# Patient Record
Sex: MALE | Race: OTHER | HISPANIC OR LATINO | URBAN - METROPOLITAN AREA
[De-identification: names, ages, dates, MRNs, and addresses within clinical notes are randomized per-mention and may not be internally consistent; named-entity substitution may affect disease eponyms.]

---

## 2023-01-15 ENCOUNTER — EMERGENCY (EMERGENCY)
Facility: HOSPITAL | Age: 23
LOS: 0 days | Discharge: HOME | End: 2023-01-15
Attending: EMERGENCY MEDICINE | Admitting: EMERGENCY MEDICINE
Payer: MEDICAID

## 2023-01-15 VITALS
OXYGEN SATURATION: 97 % | TEMPERATURE: 98 F | DIASTOLIC BLOOD PRESSURE: 53 MMHG | WEIGHT: 158.07 LBS | HEART RATE: 66 BPM | SYSTOLIC BLOOD PRESSURE: 121 MMHG | RESPIRATION RATE: 17 BRPM

## 2023-01-15 DIAGNOSIS — N48.89 OTHER SPECIFIED DISORDERS OF PENIS: ICD-10-CM

## 2023-01-15 DIAGNOSIS — N50.811 RIGHT TESTICULAR PAIN: ICD-10-CM

## 2023-01-15 LAB
APPEARANCE UR: CLEAR — SIGNIFICANT CHANGE UP
BILIRUB UR-MCNC: NEGATIVE — SIGNIFICANT CHANGE UP
COLOR SPEC: SIGNIFICANT CHANGE UP
DIFF PNL FLD: NEGATIVE — SIGNIFICANT CHANGE UP
GLUCOSE UR QL: NEGATIVE — SIGNIFICANT CHANGE UP
KETONES UR-MCNC: NEGATIVE — SIGNIFICANT CHANGE UP
LEUKOCYTE ESTERASE UR-ACNC: NEGATIVE — SIGNIFICANT CHANGE UP
NITRITE UR-MCNC: NEGATIVE — SIGNIFICANT CHANGE UP
PH UR: 7 — SIGNIFICANT CHANGE UP (ref 5–8)
PROT UR-MCNC: SIGNIFICANT CHANGE UP
SP GR SPEC: 1.03 — SIGNIFICANT CHANGE UP (ref 1.01–1.03)
UROBILINOGEN FLD QL: SIGNIFICANT CHANGE UP

## 2023-01-15 PROCEDURE — 99284 EMERGENCY DEPT VISIT MOD MDM: CPT

## 2023-01-15 NOTE — ED PROVIDER NOTE - ATTENDING APP SHARED VISIT CONTRIBUTION OF CARE
22-year-old  male without any pertinent past medical history presented for evaluation of a skin lesion on his foreskin noted today.  Patient reports similar symptoms in the past, denies any hematuria.  There is no history of STIs, patient is monogamous with his wife, she does not have any complaints. He denies any fever chills, no abdominal back pain, no urinary frequency or hematuria, no weight loss, no other additional complaints.  Well-appearing young male in no acute distress, PERRL, pink conjunctiva, speaking full sentences, lungs clear to auscultation, no midline spine or CVA TTP, abdomen soft/NT/ND,  exam, done in the presence of RADHA [place], shows normal male external genitalia, patient is uncircumcised, there are no vesicular lesions, there is a linear fissure on the foreskin which is a source of discomfort, no testicular tenderness to palpation palpable masses, no inguinal lymphadenopathy. Will check for GC and chlamydia, though this is unlikely etiology, advised to follow-up with urology, contact info provided.  Patient and wife verbalized understanding and amenable to plan.  In addition, patient reports that he sometimes uses hydrocortisone cream to the foreskin, advised against that and use Vaseline to lubricate foreskin instead.

## 2023-01-15 NOTE — ED PROVIDER NOTE - PHYSICAL EXAMINATION
CONSTITUTIONAL: Well-appearing; well-nourished; in no apparent distress.   CARDIOVASCULAR: Normal S1, S2; no murmurs, rubs, or gallops.   RESPIRATORY: Normal chest excursion with respiration; breath sounds clear and equal bilaterally; no wheezes, rhonchi, or rales.  GI: Normal bowel sounds; non-distended; non-tender; no palpable organomegaly.   : uncircumcised male.  2 small skin fissures (0.5 cm) to dorsum/ventral aspect of penis under the foreskin. no active bleeding. no ulcers or herpetic lesions noted. no scrotal/testicular swelling and tenderness.   SKIN: see  exam   NEURO/PSYCH: A & O x 4; grossly unremarkable.

## 2023-01-15 NOTE — ED PROVIDER NOTE - OBJECTIVE STATEMENT
21 yo male no sig hx present c/o sores to his penile foreskin off/on for a while with off/on right testicular pain for months. denies hx of STD and wife also denies hx of vaginal sores. patient started having the sores again today so he comes to ED for evaluation. otherwise denies urgency/frequency. denies flank pain/ penile discharge testicles swelling. Denies fever/chill/HA/dizziness/chest pain/palpitation/sob/abd pain/n/v/d

## 2023-01-15 NOTE — ED ADULT NURSE NOTE - NSIMPLEMENTINTERV_GEN_ALL_ED
Implemented All Universal Safety Interventions:  [place] to call system. Call bell, personal items and telephone within reach. Instruct patient to call for assistance. Room bathroom lighting operational. Non-slip footwear when patient is off stretcher. Physically safe environment: no spills, clutter or unnecessary equipment. Stretcher in lowest position, wheels locked, appropriate side rails in place. Neuropathy

## 2023-01-15 NOTE — ED ADULT NURSE NOTE - OBJECTIVE STATEMENT
There are no Wet Read(s) to document. Pt complaining of penile pain and right testicular pain x 1 day. also complaining of dysuria. There is 1 Wet Read(s) to document.

## 2023-01-15 NOTE — ED PROVIDER NOTE - NSFOLLOWUPINSTRUCTIONS_ED_ALL_ED_FT
Wound Care    Keep wound dry for at least 24 hours. you may clean around the wound 24 hours later. Do not remove the gelfoam on your wound to help stop bleeding. The gelfoam will fall off eventually.     Taking care of your wound properly can help to prevent pain and infection. It can also help your wound to heal more quickly.     HOW TO CARE FOR YOUR WOUND   Take or apply over-the-counter and prescription medicines only as told by your health care provider.  If you were prescribed antibiotic medicine, take or apply it as told by your health care provider. Do not stop using the antibiotic even if your condition improves.  Clean the wound each day or as told by your health care provider.  Wash the wound with mild soap and water.  Rinse the wound with water to remove all soap.  Pat the wound dry with a clean towel. Do not rub it.  There are many different ways to close and cover a wound. For example, a wound can be covered with stitches (sutures), skin glue, or adhesive strips. Follow instructions from your health care provider about:  How to take care of your wound.  When and how you should change your bandage (dressing).  When you should remove your dressing.  Removing whatever was used to close your wound.  Check your wound every day for signs of infection. Watch for:  Redness, swelling, or pain.  Fluid, blood, or pus.  Keep the dressing dry until your health care provider says it can be removed. Do not take baths, swim, use a hot tub, or do anything that would put your wound underwater until your health care provider approves.  Raise (elevate) the injured area above the level of your heart while you are sitting or lying down.  Do not scratch or pick at the wound.  Keep all follow-up visits as told by your health care provider. This is important.    SEEK MEDICAL CARE IF:  You received a tetanus shot and you have swelling, severe pain, redness, or bleeding at the injection site.  You have a fever.  Your pain is not controlled with medicine.  You have increased redness, swelling, or pain at the site of your wound.  You have fluid, blood, or pus coming from your wound.  You notice a bad smell coming from your wound or your dressing.    SEEK IMMEDIATE MEDICAL CARE IF:  You have a red streak going away from your wound.

## 2023-01-15 NOTE — ED PROVIDER NOTE - PATIENT PORTAL LINK FT
You can access the FollowMyHealth Patient Portal offered by [facility] by registering at the following website: [email or web address]. By joining [employer]’s FollowMyHealth portal, you will also be able to view your health information using other applications (apps) compatible with our system.

## 2023-01-16 LAB
C TRACH RRNA SPEC QL NAA+PROBE: SIGNIFICANT CHANGE UP
N GONORRHOEA RRNA SPEC QL NAA+PROBE: SIGNIFICANT CHANGE UP
SPECIMEN SOURCE: SIGNIFICANT CHANGE UP

## 2023-01-20 PROBLEM — Z00.00 ENCOUNTER FOR PREVENTIVE HEALTH EXAMINATION: Status: ACTIVE | Noted: 2023-01-20

## 2023-02-06 ENCOUNTER — EMERGENCY (EMERGENCY)
Facility: HOSPITAL | Age: 23
LOS: 0 days | Discharge: HOME | End: 2023-02-07
Attending: EMERGENCY MEDICINE
Payer: COMMERCIAL

## 2023-02-06 ENCOUNTER — APPOINTMENT (OUTPATIENT)
Dept: UROLOGY | Facility: CLINIC | Age: 23
End: 2023-02-06

## 2023-02-06 ENCOUNTER — NON-APPOINTMENT (OUTPATIENT)
Age: 23
End: 2023-02-06

## 2023-02-06 VITALS
HEIGHT: 66 IN | DIASTOLIC BLOOD PRESSURE: 60 MMHG | HEART RATE: 86 BPM | TEMPERATURE: 97.8 F | SYSTOLIC BLOOD PRESSURE: 101 MMHG | WEIGHT: 158 LBS | BODY MASS INDEX: 25.39 KG/M2

## 2023-02-06 VITALS
SYSTOLIC BLOOD PRESSURE: 120 MMHG | WEIGHT: 158.07 LBS | TEMPERATURE: 98 F | DIASTOLIC BLOOD PRESSURE: 65 MMHG | RESPIRATION RATE: 17 BRPM | HEART RATE: 95 BPM | OXYGEN SATURATION: 98 %

## 2023-02-06 DIAGNOSIS — R11.2 NAUSEA WITH VOMITING, UNSPECIFIED: ICD-10-CM

## 2023-02-06 DIAGNOSIS — R19.7 DIARRHEA, UNSPECIFIED: ICD-10-CM

## 2023-02-06 DIAGNOSIS — J02.9 ACUTE PHARYNGITIS, UNSPECIFIED: ICD-10-CM

## 2023-02-06 DIAGNOSIS — R10.84 GENERALIZED ABDOMINAL PAIN: ICD-10-CM

## 2023-02-06 DIAGNOSIS — R53.81 OTHER MALAISE: ICD-10-CM

## 2023-02-06 DIAGNOSIS — N50.811 RIGHT TESTICULAR PAIN: ICD-10-CM

## 2023-02-06 LAB
ANION GAP SERPL CALC-SCNC: 12 MMOL/L — SIGNIFICANT CHANGE UP (ref 7–14)
BASOPHILS # BLD AUTO: 0.03 K/UL — SIGNIFICANT CHANGE UP (ref 0–0.2)
BASOPHILS NFR BLD AUTO: 0.3 % — SIGNIFICANT CHANGE UP (ref 0–1)
BUN SERPL-MCNC: 16 MG/DL — SIGNIFICANT CHANGE UP (ref 10–20)
CALCIUM SERPL-MCNC: 9.6 MG/DL — SIGNIFICANT CHANGE UP (ref 8.4–10.5)
CHLORIDE SERPL-SCNC: 102 MMOL/L — SIGNIFICANT CHANGE UP (ref 98–110)
CO2 SERPL-SCNC: 25 MMOL/L — SIGNIFICANT CHANGE UP (ref 17–32)
CREAT SERPL-MCNC: 1 MG/DL — SIGNIFICANT CHANGE UP (ref 0.7–1.5)
EGFR: 109 ML/MIN/1.73M2 — SIGNIFICANT CHANGE UP
EOSINOPHIL # BLD AUTO: 0.03 K/UL — SIGNIFICANT CHANGE UP (ref 0–0.7)
EOSINOPHIL NFR BLD AUTO: 0.3 % — SIGNIFICANT CHANGE UP (ref 0–8)
GLUCOSE SERPL-MCNC: 97 MG/DL — SIGNIFICANT CHANGE UP (ref 70–99)
HCT VFR BLD CALC: 43.2 % — SIGNIFICANT CHANGE UP (ref 42–52)
HGB BLD-MCNC: 14.5 G/DL — SIGNIFICANT CHANGE UP (ref 14–18)
IMM GRANULOCYTES NFR BLD AUTO: 0.2 % — SIGNIFICANT CHANGE UP (ref 0.1–0.3)
LIDOCAIN IGE QN: 14 U/L — SIGNIFICANT CHANGE UP (ref 7–60)
LYMPHOCYTES # BLD AUTO: 0.79 K/UL — LOW (ref 1.2–3.4)
LYMPHOCYTES # BLD AUTO: 9.1 % — LOW (ref 20.5–51.1)
MCHC RBC-ENTMCNC: 27.6 PG — SIGNIFICANT CHANGE UP (ref 27–31)
MCHC RBC-ENTMCNC: 33.6 G/DL — SIGNIFICANT CHANGE UP (ref 32–37)
MCV RBC AUTO: 82.1 FL — SIGNIFICANT CHANGE UP (ref 80–94)
MONOCYTES # BLD AUTO: 0.47 K/UL — SIGNIFICANT CHANGE UP (ref 0.1–0.6)
MONOCYTES NFR BLD AUTO: 5.4 % — SIGNIFICANT CHANGE UP (ref 1.7–9.3)
NEUTROPHILS # BLD AUTO: 7.37 K/UL — HIGH (ref 1.4–6.5)
NEUTROPHILS NFR BLD AUTO: 84.7 % — HIGH (ref 42.2–75.2)
NRBC # BLD: 0 /100 WBCS — SIGNIFICANT CHANGE UP (ref 0–0)
PLATELET # BLD AUTO: 152 K/UL — SIGNIFICANT CHANGE UP (ref 130–400)
POTASSIUM SERPL-MCNC: 4 MMOL/L — SIGNIFICANT CHANGE UP (ref 3.5–5)
POTASSIUM SERPL-SCNC: 4 MMOL/L — SIGNIFICANT CHANGE UP (ref 3.5–5)
RBC # BLD: 5.26 M/UL — SIGNIFICANT CHANGE UP (ref 4.7–6.1)
RBC # FLD: 12.3 % — SIGNIFICANT CHANGE UP (ref 11.5–14.5)
SODIUM SERPL-SCNC: 139 MMOL/L — SIGNIFICANT CHANGE UP (ref 135–146)
WBC # BLD: 8.71 K/UL — SIGNIFICANT CHANGE UP (ref 4.8–10.8)
WBC # FLD AUTO: 8.71 K/UL — SIGNIFICANT CHANGE UP (ref 4.8–10.8)

## 2023-02-06 PROCEDURE — 85025 COMPLETE CBC W/AUTO DIFF WBC: CPT

## 2023-02-06 PROCEDURE — 83690 ASSAY OF LIPASE: CPT

## 2023-02-06 PROCEDURE — 36415 COLL VENOUS BLD VENIPUNCTURE: CPT

## 2023-02-06 PROCEDURE — 99284 EMERGENCY DEPT VISIT MOD MDM: CPT

## 2023-02-06 PROCEDURE — 76870 US EXAM SCROTUM: CPT

## 2023-02-06 PROCEDURE — 80076 HEPATIC FUNCTION PANEL: CPT

## 2023-02-06 PROCEDURE — 80048 BASIC METABOLIC PNL TOTAL CA: CPT

## 2023-02-06 PROCEDURE — 99284 EMERGENCY DEPT VISIT MOD MDM: CPT | Mod: 25

## 2023-02-06 PROCEDURE — 96374 THER/PROPH/DIAG INJ IV PUSH: CPT

## 2023-02-06 RX ORDER — ACETAMINOPHEN 500 MG
975 TABLET ORAL ONCE
Refills: 0 | Status: COMPLETED | OUTPATIENT
Start: 2023-02-06 | End: 2023-02-06

## 2023-02-06 RX ORDER — SODIUM CHLORIDE 9 MG/ML
1000 INJECTION, SOLUTION INTRAVENOUS ONCE
Refills: 0 | Status: COMPLETED | OUTPATIENT
Start: 2023-02-06 | End: 2023-02-06

## 2023-02-06 RX ORDER — ONDANSETRON 8 MG/1
4 TABLET, FILM COATED ORAL ONCE
Refills: 0 | Status: COMPLETED | OUTPATIENT
Start: 2023-02-06 | End: 2023-02-06

## 2023-02-06 RX ADMIN — ONDANSETRON 4 MILLIGRAM(S): 8 TABLET, FILM COATED ORAL at 23:09

## 2023-02-06 RX ADMIN — SODIUM CHLORIDE 1000 MILLILITER(S): 9 INJECTION, SOLUTION INTRAVENOUS at 23:09

## 2023-02-06 RX ADMIN — Medication 975 MILLIGRAM(S): at 23:09

## 2023-02-06 NOTE — ED ADULT NURSE NOTE - OBJECTIVE STATEMENT
pt c/o abdominal pain x this morning. pt states he ate cardona egg & cheese where the cardona "looked weird"   and has nausea/vomiting/diarrhea

## 2023-02-06 NOTE — ED PROVIDER NOTE - NSFOLLOWUPINSTRUCTIONS_ED_ALL_ED_FT
You were evaluated in the Emergency Department today, and your visit did not reveal anything immediately life-threatening.    However, it is important that you follow-up with your PRIMARY CARE PHYSICIAN within 3-5 days, and UROLOGIST regularly.     ---------------------------------------------------------------------------------------------------    For nausea/vomiting, you may take the following medication, sent to your pharmacy:  - Ondansetron (Zofran) 4 mg up to every 8 hours, as needed    For pain, you may take the following over-the-counter medication(s):  - Acetaminophen (Tylenol, Midol) 650-1000 mg up to every 4-6 hours, as needed (max 4000mg/24hrs), AND/OR  - Ibuprofen (Motrin, Advil) 400-800 mg up to every 6-8 hours, as needed (max 3200mg/24hrs)    For acid indigestion (heart burn symptoms), you may try the following over-the-counter medication:  - Famotidine (Pepcid) 20 mg, as needed (max 40mg/24hrs)    ---------------------------------------------------------------------------------------------------    Enfermedades virales en los niños    Viral Illness, Pediatric    Los virus son microbios diminutos que entran en el organismo de marvin persona y causan enfermedades. Hay muchos tipos de virus diferentes y causan muchas clases de enfermedades. Las enfermedades virales son muy frecuentes en los niños. La mayoría de las enfermedades virales que afectan a los niños no son graves. Anita todas desaparecen sin tratamiento después de algunos días.    En los niños, las afecciones a corto plazo más frecuentes causadas por un virus incluyen:  - Virus del resfrío y la gripe.  - Virus estomacales.  - Virus que causan fiebre y erupciones cutáneas. Estos incluyen enfermedades vinny el sarampión, la rubéola, la roséola, la quinta enfermedad y la varicela.  Las afecciones a arin plazo causadas por un virus incluyen el herpes, la poliomielitis y la infección por VIH (virus de inmunodeficiencia humana). Se michaels identificado unos pocos virus asociados con determinados tipos de cáncer.    ¿Cuáles son las causas?  Muchos tipos de virus pueden causar enfermedades. Los virus invaden las células del organismo del justen, se multiplican y provocan que las células infectadas funcionen de manera anormal o mueran. Cuando estas células mueren, liberan más virus. Cuando esto ocurre, el justen tiene síntomas de la enfermedad, y el virus sigue diseminándose a otras células. Si el virus asume la función de la célula, puede hacer que esta se divida y prolifere de manera descontrolada. Hortense ocurre cuando un virus causa cáncer.    Los diferentes virus ingresan al organismo de distintas formas. El justen es más propenso a contraer un virus si está en contacto con otra persona infectada con un virus. Hortense puede ocurrir en el hogar, en la escuela o en la guardería infantil. El justen puede contraer un virus de la siguiente forma:  - Al inhalar gotitas que marvin persona infectada liberó en el aire al toser o estornudar. Los virus del resfrío y de la gripe, así vinny aquellos que causan fiebre y erupciones cutáneas, suelen diseminarse a través de estas gotitas.  - Al tocar cualquier objeto que tenga el virus (esté contaminado) y luego tocarse la nariz, la boca o los ojos. Los objetos pueden contaminarse con un virus cuando ocurre lo siguiente:  - Les caen las gotitas que marvin persona infectada liberó al toser o estornudar.  - Tuvieron contacto con el vómito o las heces (materia fecal) de marvin persona infectada. Los virus estomacales pueden diseminarse a través del vómito o de las heces.  - Al consumir un alimento o marvin bebida que hayan estado en contacto con el virus.  - Al ser lory por un insecto o mordido por un animal que son portadores del virus.  - Al tener contacto con rachel o líquidos que contienen el virus, ya sea a través de un jean-pierre abierto o justino marvin transfusión.    ¿Cuáles son los signos o síntomas?  El justen puede tener los siguientes síntomas, dependiendo del tipo de virus y de la ubicación de las células que invade:- Virus del resfrío y de la gripe:  - Fiebre.  - Dolor de garganta.  - Jami musculares y de dolor de shawna.  - Congestión nasal.  - Dolor de oídos.  - Tos.  - Virus estomacales:  - Fiebre.  - Pérdida del apetito.  - Vómitos.  - Dolor de estómago.  - Diarrea.  - Virus que causan fiebre y erupciones cutáneas:  - Fiebre.  - Glándulas inflamadas.  - Erupción cutánea.  - Secreción nasal.    ¿Cómo se diagnostica?  Esta afección se puede diagnosticar en función de lo siguiente:  - Síntomas.  - Antecedentes médicos.  - Examen físico.  - Análisis de rachel, marvin muestra de mucosidad de los pulmones (muestra de esputo) o un hisopado de líquidos corporales o marvin llaga de la piel (lesión).    ¿Cómo se trata?  La mayoría de las enfermedades virales en los niños desaparecen en el término de 3 a 10 días. En la mayoría de los casos, no se necesita tratamiento. El pediatra puede sugerir que se administren medicamentos de venta leeanne para aliviar los síntomas.  Marvin enfermedad viral no se puede tratar con antibióticos. Los virus viven adentro de las células, y los antibióticos no pueden penetrar en ellas. En cambio, a veces se usan los antivirales para tratar las enfermedades virales, lexi broderick vez es necesario administrarles estos medicamentos a los niños.  Muchas enfermedades virales de la niñez pueden evitarse con vacunas (inmunizaciones). Estas vacunas ayudan a prevenir la gripe y muchos de los virus que causan fiebre y erupciones cutáneas.    Siga estas instrucciones en mart casa:  Medicamentos   - Adminístrele los medicamentos de venta leeanne y los recetados al justen solamente vinny se lo haya indicado el pediatra. Generalmente, no es necesario administrar medicamentos para el resfrío y la gripe. Si el justen tiene fiebre, pregúntele al médico qué medicamento de venta leeanne administrarle y qué cantidad o dosis.  -  No le dé aspirina al justen por el riesgo de que contraiga el síndrome de [place].  - Si el justen es mayor de 4 años y tiene tos o dolor de garganta, pregúntele al médico si puede darle gotas para la tos o pastillas para la garganta.  -  No solicite marvin receta de antibióticos si al justen le diagnosticaron marvin enfermedad viral. Los antibióticos no harán que la enfermedad del justen desaparezca más rápidamente. Además, jv antibióticos con frecuencia cuando no son necesarios puede derivar en resistencia a los antibióticos. Cuando esto ocurre, el medicamento pierde mart eficacia contra las bacterias que normalmente combate.  - Si al justen le recetaron un medicamento antiviral, adminístreselo vinny se lo haya indicado el pediatra. No deje de darle el antiviral al justen aunque comience a sentirse mejor.  - Si el justen tiene vómitos, kate solamente sorbos de líquidos néstor. Ofrézcale sorbos de líquido con frecuencia. Siga las instrucciones del pediatra respecto de las restricciones para las comidas o las bebidas.  - Si el justen puede beber líquidos, mirian que tome la cantidad suficiente para mantener la orina de color amarillo pálido.    Indicaciones generales   - Asegúrese de que el justen descanse lo suficiente.  - Si el justen tiene congestión nasal, pregúntele al pediatra si puede ponerle gotas o un aerosol de solución salina en la nariz.  - Si el justen tiene tos, coloque en mart habitación un humidificador de vapor frío.  - Si el justen es mayor de 1 año y tiene tos, pregúntele al pediatra si puede darle cucharaditas de miel y con qué frecuencia.  - Mirian que el justen se quede en mart casa y descanse hasta que los síntomas hayan desaparecido. Mirian que el justen reanude talia actividades normales vinny se lo haya indicado el pediatra. Consulte al pediatra qué actividades son seguras para él.  - Concurra a todas las visitas de seguimiento vinny se lo haya indicado el pediatra. Hortense es importante.    ¿Cómo se previene?  Para reducir el riesgo de que el justen tenga marvin enfermedad viral:  - Enséñele al justen a lavarse frecuentemente las jairo con agua y jabón justino al menos 20 segundos. Si no dispone de agua y jabón, debe usar un desinfectante para jairo.  - Enséñele al justen a que no se toque la nariz, los ojos y la boca, especialmente si no se ha lavado las jairo recientemente.  - Si un miembro de la sabrina tiene marvin infección viral, limpie todas las superficies de la casa que puedan lucio estado en contacto con el virus. Use Telida y jabón. También puede usar lejía con agua agregada (diluido).  - Mantenga al justen alejado de las personas enfermas con síntomas de marvin infección viral.  - Enséñele al justen a no compartir objetos, vinny cepillos de dientes y botellas de agua, con otras personas.  - Mantenga al día todas las vacunas del justen.  - Mirian que el justen coma marvin dieta gabriel y descanse mucho.    Comuníquese con un médico si:  - El justen tiene síntomas de marvin enfermedad viral justino más tiempo de lo esperado. Pregúntele al pediatra cuánto tiempo deberían durar los síntomas.  - El tratamiento en la casa no controla los síntomas del justen o estos están empeorando.  - El justen tiene vómitos que mccarthy más de 24 horas.    Solicite ayuda de inmediato si:  - El justen es vida de 3 meses y tiene fiebre de 100.4 °F (38 °C) o más.  - Tiene un justen de 3 meses a 3 años de edad que presenta fiebre de 102.2 °F (39 °C) o más.  - El justen tiene problemas para respirar.  - El justen tiene dolor de shawna intenso o rigidez en el shelby.  Estos síntomas pueden representar un problema grave que constituye marvin emergencia. No espere a lexy si los síntomas desaparecen. Solicite atención médica de inmediato. Comuníquese con el servicio de emergencias de mart localidad (911 en los [place]).     Resumen  - Los virus son microbios diminutos que entran en el organismo de marvin persona y causan enfermedades.  - La mayoría de las enfermedades virales que afectan a los niños no son graves. Anita todas desaparecen sin tratamiento después de algunos días.  - Los síntomas pueden incluir fiebre, dolor de garganta, tos, diarrea o erupción cutánea.  - Adminístrele los medicamentos de venta leeanne y los recetados al justen solamente vinny se lo haya indicado el pediatra. Generalmente, no es necesario administrar medicamentos para el resfrío y la gripe. Si el justen tiene fiebre, pregúntele al médico qué medicamento de venta leeanne administrarle y qué cantidad.  - Comuníquese con el pediatra si el justen tiene síntomas de marvin enfermedad viral justino más tiempo de lo esperado. Pregúntele al pediatra cuánto tiempo deberían durar los síntomas.    Esta información no tiene vinny fin reemplazar el consejo del médico. Asegúrese de hacerle al médico cualquier pregunta que tenga.    Document Revised: 07/07/2021 Document Reviewed: 07/07/2021  Elsevier Patient Education © 2022 Else[employer] Inc[employer]

## 2023-02-06 NOTE — ED PROVIDER NOTE - OBJECTIVE STATEMENT
Patient is a 22-year-old male without any past medical history, presenting for abdominal discomfort, vomiting, diarrhea since today.  Emesis is NBNB, too many episodes to count, and diarrhea is NB, 6–7 times today. He also feels malaise, body aches, dry cough, and sore throat. Wife and brother-in-law with similar symptoms.  Patient and his  family all ate the same cardona egg and cheese breakfast sandwich yesterday which they stated had a weird appearing cardona but they ate it anyway, and after that were at a baby shower.  They suspect that they were exposed to spoiled food.  Reports right-sided testicular aching that has been occurring for 1 year, for which she saw urology today who recommended nonemergent ultrasound of his testicles. No other complaints - no fever, rhinorrhea, CP, palpitations, SOB, dysuria, hematuria, FND, rash, trauma.

## 2023-02-06 NOTE — ED PROVIDER NOTE - PROGRESS NOTE DETAILS
Resident AO: Pt is feeling better and tolerating PO. Ultrasound completed, but pending read.  I will personally reach out to the patient if there are any acute findings.  Patient has a urologist Dr. Hoang, who is associated with [facility], and with whom the patient will follow-up. ED Attending CUBA Rivas  pt does not want to wait for ultrasound results, reporting feeling much better, abd re exam soft, ntnd, no r/g, tolerating po, aware of signs and symptoms to return for, will follow up with pmd and gi as discussed.

## 2023-02-06 NOTE — ED PROVIDER NOTE - ATTENDING CONTRIBUTION TO CARE
22-year-old male without any past medical history presents with generalized abdominal pain, mild, constant, nonradiating, no alleviating or precipitating factors associated nausea and vomiting of bilious nonbloody and nonbloody diarrhea today.  Patient reports he has been vomiting since 6 AM to 3 PM and has not been able to tolerate p.o.  Associated symptoms also include body aches, dry cough and sore throat from vomiting multiple times.  Wife and brother are sick with similar symptoms. Pt reports they had a baby shower and had cardona egg and cheese breakfast sandwich yesterday, reports cardona looked weird but ate it anyway and continue to eat other food at baby shower they went to. The next daily pt and family woke with similar symptoms including the cousin who is not in the emergency department.  Patient also reports he is due for a right testicular ultrasound as he has been having right-sided testicular pain for the past.  No testicular pain at this time, so urologist recommended nonemergent ultrasound of the his testicles.  While in the emergency department patient is requesting this imaging.  denies fever, chills,, cp, sob, pleuritic chest pain, palpitations, diaphoresis, cough, constipation, melena/brbpr, urinary symptoms, back/ flank pain, penile pain/discharge, current testicular pain/swelling/erythema, history of sti's, rectal pain or tenesmus, recent travel or rash.      on exam: non toxic appearing pt sitting on stretcher in nad, no rash, mmm, regular rate, radial pulses 2/4 b/l, no jvd, ctabl w/ breath sounds present b/l, no wheezing or crackles, no accessory muscle use, no tachypnea, no stridor, bs present throughout all 4 quadrants, abd soft, nd, nt, no rebound tenderness or guarding, no cvat, (-) Rovsing (-) Obturator (-) Psaos. (-) Tran's,  FROM of ext, no edema, no calf pain/swelling/erythema, AAOx3. No focal deficits.     Plan: Labs, ivf, anti emetic, pain control, reassess. 22-year-old male without any past medical history presents with generalized abdominal pain, mild, constant, nonradiating, no alleviating or precipitating factors associated nausea and vomiting of bilious nonbloody and nonbloody diarrhea today.  Patient reports he has been vomiting since 6 AM to 3 PM and has not been able to tolerate p.o.  Associated symptoms also include body aches, dry cough and sore throat from vomiting multiple times.  Wife and brother are sick with similar symptoms. Pt reports they had a baby shower and had cardona egg and cheese breakfast sandwich yesterday, reports cardona looked weird but ate it anyway and continue to eat other food at baby shower they went to. The next daily pt and family woke with similar symptoms including the cousin who is not in the emergency department.  Patient also reports he is due for a right testicular ultrasound as he has been having right-sided testicular pain for the past.  No testicular pain at this time, so urologist recommended nonemergent ultrasound of the his testicles.  While in the emergency department patient is requesting this imaging.  denies fever, chills,, cp, sob, pleuritic chest pain, palpitations, diaphoresis, cough, constipation, melena/brbpr, urinary symptoms, back/ flank pain, penile pain/discharge, current testicular pain/swelling/erythema, history of sti's, rectal pain or tenesmus, recent travel or rash.     on exam: non toxic appearing pt sitting on stretcher in nad, no rash, mmm, no erythema, exudates, or petechiae, no PTA, no anterior neck pain to palpation, no oropharyngeal edema. regular rate, radial pulses 2/4 b/l, no jvd, ctabl w/ breath sounds present b/l, no wheezing or crackles, no accessory muscle use, no tachypnea, no stridor, bs present throughout all 4 quadrants, abd soft, nd, nt.. , no rebound tenderness or guarding, no cvat, (-) Rovsing (-) Obturator (-) Psaos. (-) Tran's,  FROM of ext, no edema, no calf pain/swelling/erythema, AAOx3. No focal deficits.    Plan: Labs, ivf, anti emetic, pain control, reassess.

## 2023-02-06 NOTE — ED ADULT NURSE NOTE - NSIMPLEMENTINTERV_GEN_ALL_ED
Implemented All Universal Safety Interventions:  [place] to call system. Call bell, personal items and telephone within reach. Instruct patient to call for assistance. Room bathroom lighting operational. Non-slip footwear when patient is off stretcher. Physically safe environment: no spills, clutter or unnecessary equipment. Stretcher in lowest position, wheels locked, appropriate side rails in place.

## 2023-02-06 NOTE — ED PROVIDER NOTE - PHYSICAL EXAMINATION
_  CONSTITUTIONAL: NAD  SKIN: Warm, dry  HEAD: NCAT  EYES: Clear conjunctiva   ENT: MMM  NECK: Supple  CARD: RRR, S1, S2; no M/R/G  RESP: Speaking in full sentences; CTAB  ABD: S/NT, no R/G  EXT: Pulses palpable distally  NEURO: Grossly intact  PSYCH: Cooperative, appropriate

## 2023-02-06 NOTE — ED ADULT TRIAGE NOTE - CHIEF COMPLAINT QUOTE
pt with abdominal pain since today  states eating cardona egg & cheese where the cradona "looked weird"   and today with symptoms of nausea/vomiting/diarrhea  also c/o joint pain

## 2023-02-06 NOTE — HISTORY OF PRESENT ILLNESS
[FreeTextEntry1] : This is a 22 year male who presents with fissures to the foreskin for the last year\par no history of diabetes \par usually worse after sex\par \par also with right testicular pain\par \par Feb 2023\par IPSS - 5 - mild symptoms\par IIEF - 14 -- mild to moderate ED\par \par PE -- healed fissues to penis\par no pain or abnormaltiies to the scrotum/testes

## 2023-02-06 NOTE — ED PROVIDER NOTE - CLINICAL SUMMARY MEDICAL DECISION MAKING FREE TEXT BOX
22-year-old male without any past medical history presents with generalized abdominal pain, mild, constant, nonradiating, no alleviating or precipitating factors associated nausea and vomiting of bilious nonbloody and nonbloody diarrhea today.  Patient reports he has been vomiting since 6 AM to 3 PM and has not been able to tolerate p.o.  Associated symptoms also include body aches, dry cough and sore throat from vomiting multiple times.  Wife and brother are sick with similar symptoms. Pt reports they had a baby shower and had cardona egg and cheese breakfast sandwich yesterday, reports cardona looked weird but ate it anyway and continue to eat other food at baby shower they went to. The next daily pt and family woke with similar symptoms including the cousin who is not in the emergency department.  Patient also reports he is due for a right testicular ultrasound as he has been having right-sided testicular pain for the past.  No testicular pain at this time, so urologist recommended nonemergent ultrasound of the his testicles.  While in the emergency department patient is requesting this imaging.  denies fever, chills,, cp, sob, pleuritic chest pain, palpitations, diaphoresis, cough, constipation, melena/brbpr, urinary symptoms, back/ flank pain, penile pain/discharge, current testicular pain/swelling/erythema, history of sti's, rectal pain or tenesmus, recent travel or rash.      on exam: non toxic appearing pt sitting on stretcher in nad, no rash, mmm, regular rate, radial pulses 2/4 b/l, no jvd, ctabl w/ breath sounds present b/l, no wheezing or crackles, no accessory muscle use, no tachypnea, no stridor, bs present throughout all 4 quadrants, abd soft, nd, nt, no rebound tenderness or guarding, no cvat, (-) Rovsing (-) Obturator (-) Psaos. (-) Tran's,  FROM of ext, no edema, no calf pain/swelling/erythema, AAOx3. No focal deficits.     Plan: Labs, ivf, anti emetic, pain control, reassess.     Labs were ordered and reviewed.  Imaging was ordered and reviewed by me.  Appropriate medications for patient's presenting complaints were ordered and effects were reassessed.  Patient's records (prior  ED visit) were reviewed.  Additional history was obtained from family.  Escalation to admission/observation was considered. However patient feels much better and is comfortable with discharge.  Appropriate follow-up was arranged. 22-year-old male without any past medical history presents with generalized abdominal pain, mild, constant, nonradiating, no alleviating or precipitating factors associated nausea and vomiting of bilious nonbloody and nonbloody diarrhea today.  Patient reports he has been vomiting since 6 AM to 3 PM and has not been able to tolerate p.o.  Associated symptoms also include body aches, dry cough and sore throat from vomiting multiple times.  Wife and brother are sick with similar symptoms. Pt reports they had a baby shower and had cardona egg and cheese breakfast sandwich yesterday, reports cardona looked weird but ate it anyway and continue to eat other food at baby shower they went to. The next daily pt and family woke with similar symptoms including the cousin who is not in the emergency department.  Patient also reports he is due for a right testicular ultrasound as he has been having right-sided testicular pain for the past.  No testicular pain at this time, so urologist recommended nonemergent ultrasound of the his testicles.  While in the emergency department patient is requesting this imaging.  denies fever, chills,, cp, sob, pleuritic chest pain, palpitations, diaphoresis, cough, constipation, melena/brbpr, urinary symptoms, back/ flank pain, penile pain/discharge, current testicular pain/swelling/erythema, history of sti's, rectal pain or tenesmus, recent travel or rash.     on exam: non toxic appearing pt sitting on stretcher in nad, no rash, mmm, no erythema, exudates, or petechiae, no PTA, no anterior neck pain to palpation, no oropharyngeal edema. regular rate, radial pulses 2/4 b/l, no jvd, ctabl w/ breath sounds present b/l, no wheezing or crackles, no accessory muscle use, no tachypnea, no stridor, bs present throughout all 4 quadrants, abd soft, nd, nt.. , no rebound tenderness or guarding, no cvat, (-) Rovsing (-) Obturator (-) Psaos. (-) Tran's,  FROM of ext, no edema, no calf pain/swelling/erythema, AAOx3. No focal deficits.    Plan: Labs, ivf, anti emetic, pain control, reassess.    Labs were ordered and reviewed.  Imaging was ordered and reviewed by me.  Appropriate medications for patient's presenting complaints were ordered and effects were reassessed.  Patient's records (prior  ED visit) were reviewed.  Additional history was obtained from family.  Escalation to admission/observation was considered. However patient feels much better and is comfortable with discharge.  Appropriate follow-up was arranged.

## 2023-02-06 NOTE — ED ADULT NURSE NOTE - CHIEF COMPLAINT QUOTE
pt with abdominal pain since today  states eating cardona egg & cheese where the cardona "looked weird"   and today with symptoms of nausea/vomiting/diarrhea  also c/o joint pain

## 2023-02-06 NOTE — ED PROVIDER NOTE - PATIENT PORTAL LINK FT
You can access the FollowMyHealth Patient Portal offered by [facility] by registering at the following website: [email or web address]. By joining [employer]’s FollowMyHealth portal, you will also be able to view your health information using other applications (apps) compatible with our system.

## 2023-02-07 VITALS
HEART RATE: 78 BPM | RESPIRATION RATE: 19 BRPM | OXYGEN SATURATION: 100 % | SYSTOLIC BLOOD PRESSURE: 123 MMHG | DIASTOLIC BLOOD PRESSURE: 74 MMHG | TEMPERATURE: 99 F

## 2023-02-07 LAB
ALBUMIN SERPL ELPH-MCNC: 4.7 G/DL — SIGNIFICANT CHANGE UP (ref 3.5–5.2)
ALP SERPL-CCNC: 107 U/L — SIGNIFICANT CHANGE UP (ref 30–115)
ALT FLD-CCNC: 29 U/L — SIGNIFICANT CHANGE UP (ref 0–41)
AST SERPL-CCNC: 20 U/L — SIGNIFICANT CHANGE UP (ref 0–41)
BILIRUB DIRECT SERPL-MCNC: <0.2 MG/DL — SIGNIFICANT CHANGE UP (ref 0–0.3)
BILIRUB INDIRECT FLD-MCNC: >0.2 MG/DL — SIGNIFICANT CHANGE UP (ref 0.2–1.2)
BILIRUB SERPL-MCNC: 0.4 MG/DL — SIGNIFICANT CHANGE UP (ref 0.2–1.2)
PROT SERPL-MCNC: 7.3 G/DL — SIGNIFICANT CHANGE UP (ref 6–8)

## 2023-02-07 PROCEDURE — 76870 US EXAM SCROTUM: CPT | Mod: 26

## 2023-02-07 RX ORDER — ONDANSETRON 8 MG/1
1 TABLET, FILM COATED ORAL
Qty: 9 | Refills: 0
Start: 2023-02-07 | End: 2023-02-09

## 2023-02-08 ENCOUNTER — TRANSCRIPTION ENCOUNTER (OUTPATIENT)
Age: 23
End: 2023-02-08

## 2023-02-20 ENCOUNTER — EMERGENCY (EMERGENCY)
Facility: HOSPITAL | Age: 23
LOS: 0 days | Discharge: HOME | End: 2023-02-20
Attending: STUDENT IN AN ORGANIZED HEALTH CARE EDUCATION/TRAINING PROGRAM
Payer: MEDICAID

## 2023-02-20 VITALS
TEMPERATURE: 98 F | SYSTOLIC BLOOD PRESSURE: 130 MMHG | RESPIRATION RATE: 18 BRPM | DIASTOLIC BLOOD PRESSURE: 63 MMHG | WEIGHT: 158.07 LBS | OXYGEN SATURATION: 99 % | HEART RATE: 107 BPM

## 2023-02-20 DIAGNOSIS — R05.8 OTHER SPECIFIED COUGH: ICD-10-CM

## 2023-02-20 DIAGNOSIS — R07.89 OTHER CHEST PAIN: ICD-10-CM

## 2023-02-20 DIAGNOSIS — R50.9 FEVER, UNSPECIFIED: ICD-10-CM

## 2023-02-20 DIAGNOSIS — Z20.822 CONTACT WITH AND (SUSPECTED) EXPOSURE TO COVID-19: ICD-10-CM

## 2023-02-20 DIAGNOSIS — R05.1 ACUTE COUGH: ICD-10-CM

## 2023-02-20 LAB
ALBUMIN SERPL ELPH-MCNC: 4.6 G/DL — SIGNIFICANT CHANGE UP (ref 3.5–5.2)
ALP SERPL-CCNC: 122 U/L — HIGH (ref 30–115)
ALT FLD-CCNC: 30 U/L — SIGNIFICANT CHANGE UP (ref 0–41)
ANION GAP SERPL CALC-SCNC: 7 MMOL/L — SIGNIFICANT CHANGE UP (ref 7–14)
AST SERPL-CCNC: 18 U/L — SIGNIFICANT CHANGE UP (ref 0–41)
BASOPHILS # BLD AUTO: 0.04 K/UL — SIGNIFICANT CHANGE UP (ref 0–0.2)
BASOPHILS NFR BLD AUTO: 0.3 % — SIGNIFICANT CHANGE UP (ref 0–1)
BILIRUB SERPL-MCNC: <0.2 MG/DL — SIGNIFICANT CHANGE UP (ref 0.2–1.2)
BUN SERPL-MCNC: 20 MG/DL — SIGNIFICANT CHANGE UP (ref 10–20)
CALCIUM SERPL-MCNC: 9.1 MG/DL — SIGNIFICANT CHANGE UP (ref 8.4–10.4)
CHLORIDE SERPL-SCNC: 101 MMOL/L — SIGNIFICANT CHANGE UP (ref 98–110)
CO2 SERPL-SCNC: 26 MMOL/L — SIGNIFICANT CHANGE UP (ref 17–32)
CREAT SERPL-MCNC: 0.9 MG/DL — SIGNIFICANT CHANGE UP (ref 0.7–1.5)
D DIMER BLD IA.RAPID-MCNC: <150 NG/ML DDU — SIGNIFICANT CHANGE UP
EGFR: 124 ML/MIN/1.73M2 — SIGNIFICANT CHANGE UP
EOSINOPHIL # BLD AUTO: 0.09 K/UL — SIGNIFICANT CHANGE UP (ref 0–0.7)
EOSINOPHIL NFR BLD AUTO: 0.8 % — SIGNIFICANT CHANGE UP (ref 0–8)
FLUAV AG NPH QL: SIGNIFICANT CHANGE UP
FLUBV AG NPH QL: SIGNIFICANT CHANGE UP
GLUCOSE SERPL-MCNC: 101 MG/DL — HIGH (ref 70–99)
HCT VFR BLD CALC: 40.5 % — LOW (ref 42–52)
HGB BLD-MCNC: 13.4 G/DL — LOW (ref 14–18)
IMM GRANULOCYTES NFR BLD AUTO: 0.3 % — SIGNIFICANT CHANGE UP (ref 0.1–0.3)
LYMPHOCYTES # BLD AUTO: 1.2 K/UL — SIGNIFICANT CHANGE UP (ref 1.2–3.4)
LYMPHOCYTES # BLD AUTO: 10.1 % — LOW (ref 20.5–51.1)
MCHC RBC-ENTMCNC: 27.2 PG — SIGNIFICANT CHANGE UP (ref 27–31)
MCHC RBC-ENTMCNC: 33.1 G/DL — SIGNIFICANT CHANGE UP (ref 32–37)
MCV RBC AUTO: 82.3 FL — SIGNIFICANT CHANGE UP (ref 80–94)
MONOCYTES # BLD AUTO: 0.95 K/UL — HIGH (ref 0.1–0.6)
MONOCYTES NFR BLD AUTO: 8 % — SIGNIFICANT CHANGE UP (ref 1.7–9.3)
NEUTROPHILS # BLD AUTO: 9.56 K/UL — HIGH (ref 1.4–6.5)
NEUTROPHILS NFR BLD AUTO: 80.5 % — HIGH (ref 42.2–75.2)
NRBC # BLD: 0 /100 WBCS — SIGNIFICANT CHANGE UP (ref 0–0)
PLATELET # BLD AUTO: 155 K/UL — SIGNIFICANT CHANGE UP (ref 130–400)
POTASSIUM SERPL-MCNC: 3.8 MMOL/L — SIGNIFICANT CHANGE UP (ref 3.5–5)
POTASSIUM SERPL-SCNC: 3.8 MMOL/L — SIGNIFICANT CHANGE UP (ref 3.5–5)
PROT SERPL-MCNC: 7.3 G/DL — SIGNIFICANT CHANGE UP (ref 6–8)
RBC # BLD: 4.92 M/UL — SIGNIFICANT CHANGE UP (ref 4.7–6.1)
RBC # FLD: 12.4 % — SIGNIFICANT CHANGE UP (ref 11.5–14.5)
RSV RNA NPH QL NAA+NON-PROBE: SIGNIFICANT CHANGE UP
SARS-COV-2 RNA SPEC QL NAA+PROBE: SIGNIFICANT CHANGE UP
SODIUM SERPL-SCNC: 134 MMOL/L — LOW (ref 135–146)
TROPONIN T SERPL-MCNC: <0.01 NG/ML — SIGNIFICANT CHANGE UP
WBC # BLD: 11.88 K/UL — HIGH (ref 4.8–10.8)
WBC # FLD AUTO: 11.88 K/UL — HIGH (ref 4.8–10.8)

## 2023-02-20 PROCEDURE — 84484 ASSAY OF TROPONIN QUANT: CPT

## 2023-02-20 PROCEDURE — 71045 X-RAY EXAM CHEST 1 VIEW: CPT

## 2023-02-20 PROCEDURE — 99285 EMERGENCY DEPT VISIT HI MDM: CPT | Mod: 25

## 2023-02-20 PROCEDURE — 36415 COLL VENOUS BLD VENIPUNCTURE: CPT

## 2023-02-20 PROCEDURE — 96372 THER/PROPH/DIAG INJ SC/IM: CPT

## 2023-02-20 PROCEDURE — 0241U: CPT

## 2023-02-20 PROCEDURE — 93010 ELECTROCARDIOGRAM REPORT: CPT

## 2023-02-20 PROCEDURE — 71045 X-RAY EXAM CHEST 1 VIEW: CPT | Mod: 26

## 2023-02-20 PROCEDURE — 93005 ELECTROCARDIOGRAM TRACING: CPT

## 2023-02-20 PROCEDURE — 85025 COMPLETE CBC W/AUTO DIFF WBC: CPT

## 2023-02-20 PROCEDURE — 99285 EMERGENCY DEPT VISIT HI MDM: CPT

## 2023-02-20 PROCEDURE — 85379 FIBRIN DEGRADATION QUANT: CPT

## 2023-02-20 PROCEDURE — 80053 COMPREHEN METABOLIC PANEL: CPT

## 2023-02-20 RX ORDER — ACETAMINOPHEN 500 MG
975 TABLET ORAL ONCE
Refills: 0 | Status: COMPLETED | OUTPATIENT
Start: 2023-02-20 | End: 2023-02-20

## 2023-02-20 RX ORDER — KETOROLAC TROMETHAMINE 30 MG/ML
30 SYRINGE (ML) INJECTION ONCE
Refills: 0 | Status: DISCONTINUED | OUTPATIENT
Start: 2023-02-20 | End: 2023-02-20

## 2023-02-20 RX ADMIN — Medication 30 MILLIGRAM(S): at 19:16

## 2023-02-20 RX ADMIN — Medication 975 MILLIGRAM(S): at 17:44

## 2023-02-20 NOTE — ED PROVIDER NOTE - OBJECTIVE STATEMENT
Patient is a 22 year old male with no PMH presenting for cough. Patient endorses nasal congestion, subjective fevers, pleuritic chest discomfort, and a productive cough (greenish and blood-tinged sputum) x2 days. Patient took Ibuprofen shortly prior to coming to the ER with some relief. Patient denies any additional complaints.

## 2023-02-20 NOTE — ED PROVIDER NOTE - PATIENT PORTAL LINK FT
You can access the FollowMyHealth Patient Portal offered by [facility] by registering at the following website: [email or web address]. By joining [employer]’s FollowMyHealth portal, you will also be able to view your health information using other applications (apps) compatible with our system.

## 2023-02-20 NOTE — ED PROVIDER NOTE - PHYSICAL EXAMINATION
VITAL SIGNS: I have reviewed nursing notes and confirm.  CONSTITUTIONAL: Well-appearing, non-toxic, in NAD  SKIN: Warm dry, normal skin turgor  HEAD: NCAT  EYES: No conjunctival injection, scleral anicteric  ENT: Moist mucous membranes, normal pharynx with no erythema or exudates  NECK: Supple; full ROM. Nontender. No cervical LAD  CARD: Tachycardic, no murmurs, rubs or gallops  RESP: Congested but otherwise no rales, rhonchi, or wheezing.  ABD: Soft, non-distended, non-tender, no rebound or guarding. No CVA tenderness  EXT: Full ROM, no bony tenderness, no pedal edema, no calf tenderness  NEURO: Normal motor, normal sensory. CN II-XII grossly intact. Cerebellar testing normal. Normal gait.  PSYCH: Cooperative, appropriate.

## 2023-02-20 NOTE — ED PROVIDER NOTE - CLINICAL SUMMARY MEDICAL DECISION MAKING FREE TEXT BOX
Previously healthy 22M presents to [facility] for eval of cough x2 days with green sputum and small amount of hemoptysis. EKG S1Q3T3. CXR no infiltrate, PTX or pleural effusion. Labs and imaging personally reviewed. pt given tylenol, toradol.

## 2023-02-20 NOTE — ED PROVIDER NOTE - NSFOLLOWUPINSTRUCTIONS_ED_ALL_ED_FT
DISCHARGE INSTRUCTIONS:  - Please follow up with your pediatrician within 24 hours of discharge.    Call 911 right away if your child has any of these symptoms:  - Less alert or loss of consciousness  - Blue, purple, or gray color to skin, fingertips or lips  - Trouble breathing  - Unable to talk  - Wheezing that doesn't get better with treatment    When to call your child's healthcare provider  - Call your child’s healthcare provider right away if your child has any of these symptoms:  - Breathing faster than normal  - Pale skin color  - Vomiting

## 2023-02-20 NOTE — ED PROVIDER NOTE - ATTENDING CONTRIBUTION TO CARE
Previously healthy 22M presents to [facility] for eval of cough x2 days with green sputum and small amount of hemoptysis. He reports cough on and off for the past several months. Denies fever, reports 8 hour drive to and from [place] in October. Unknown family history. Vaccines utd. Denies h/o asthma or smoking.     vs noted, triage note reviewed    Gen - NAD, Head - NCAT, Pharynx - clear, MMM, Heart - RRR, no m/g/r, Lungs - CTAB, no w/c/r, Abdomen - soft, NT, ND, Skin - No rash, Extremities - FROM, no edema, erythema, ecchymosis, brisk cap refill, Neuro - A&O x3, equal strength and sensation, non-focal exam    a/p:  cough with green sputum  CXR, EKG reassess

## 2023-02-20 NOTE — ED ADULT TRIAGE NOTE - ACCOMPANIED BY
Refill not due until May 2020.  Pt is due for her follow up. Letter mailed reminding her to call clinic and schedule.      amitriptyline (ELAVIL) 10 MG tablet 60 tablet 2 2/3/2020     Sig: TAKE 2 TABLETS BY MOUTH EVERY NIGHT               Self

## 2023-03-14 ENCOUNTER — OUTPATIENT (OUTPATIENT)
Dept: OUTPATIENT SERVICES | Facility: HOSPITAL | Age: 23
LOS: 1 days | End: 2023-03-14
Payer: COMMERCIAL

## 2023-03-14 VITALS
DIASTOLIC BLOOD PRESSURE: 64 MMHG | HEIGHT: 68 IN | HEART RATE: 65 BPM | WEIGHT: 156.97 LBS | OXYGEN SATURATION: 98 % | SYSTOLIC BLOOD PRESSURE: 116 MMHG | TEMPERATURE: 96 F

## 2023-03-14 DIAGNOSIS — N47.1 PHIMOSIS: ICD-10-CM

## 2023-03-14 DIAGNOSIS — Z01.818 ENCOUNTER FOR OTHER PREPROCEDURAL EXAMINATION: ICD-10-CM

## 2023-03-14 DIAGNOSIS — Z98.890 OTHER SPECIFIED POSTPROCEDURAL STATES: Chronic | ICD-10-CM

## 2023-03-14 PROCEDURE — 81001 URINALYSIS AUTO W/SCOPE: CPT

## 2023-03-14 PROCEDURE — 99214 OFFICE O/P EST MOD 30 MIN: CPT | Mod: 25

## 2023-03-14 PROCEDURE — 87086 URINE CULTURE/COLONY COUNT: CPT

## 2023-03-14 NOTE — H&P PST ADULT - PAIN ASSESSMENT COMPLETED
Called patient in regards to recommendations. She would prefer to have the cologuard. She will call her PCP to have it ordered. Verbalized understanding. PCP notified.  
Per Dr. White yearly Occult FIT preferred over cologuard. Call placed to patient, mailbox full and unable to leave message. Will try again at a later time.   
Received a message from direct messages (surescripts) regarding repeat colonoscopy vs cologuard. Left message asking patient to call office to discuss cologuard vs colonoscopy.  If patient decides to go with the cologuard, should be ordered by her PCP since patient has not established care with Dr. Burnette.  Will await return call.  
Initial (On Arrival)
Yes

## 2023-03-14 NOTE — H&P PST ADULT - HISTORY OF PRESENT ILLNESS
21 yo male presents for PAST in preparation for circumcision.   Pt complains of  penile pain during intercourses. Also reporst right testicular pain, US was done was diagnosed wit hydrocele.  Denies any chest pain, difficulty breathing, SOB, palpitations, dysuria, URI, or any other infections in the last 2 weeks/1 month. Denies any recent travel, contact, or exposure to any persons with known or suspected COVID-19. Pt also denies COVID testing within the last 2 weeks. Pt advised to self quarantine until day of procedure. Exercise tolerance of 2-3 flights of stairs/ one mile without dyspnea. BART reviewed with patient.    Anesthesia Alert  NO--Difficult Airway  NO--History of neck surgery or radiation  NO--Limited ROM of neck  NO--History of Malignant hyperthermia  NO--Personal or family history of Pseudocholinesterase deficiency.  NO--Prior Anesthesia Complication  NO--Latex Allergy  NO--Loose teeth  NO--History of Rheumatoid Arthritis  NO--BART  NO--Bleeding risk  NO--Other_____   written and verbal instructions with teach back on chlorhexidine shampoo provided,  pt verbalized understanding with returned demonstration  Patient verbalized understanding of instructions and was given the opportunity to ask questions and have them answered.   21 yo male presents for PAST in preparation for circumcision.   Pt complains of  penile pain during intercourses. Also reporst right testicular pain, US was done was diagnosed wit hydrocele. Pt is  and has no children at this time.   Denies any chest pain, difficulty breathing, SOB, palpitations, dysuria, URI, or any other infections in the last 2 weeks/1 month. Denies any recent travel, contact, or exposure to any persons with known or suspected COVID-19. Pt also denies COVID testing within the last 2 weeks. Pt advised to self quarantine until day of procedure. Exercise tolerance of 2-3 flights of stairs/ one mile without dyspnea. BART reviewed with patient.    Anesthesia Alert  NO--Difficult Airway  NO--History of neck surgery or radiation  NO--Limited ROM of neck  NO--History of Malignant hyperthermia  NO--Personal or family history of Pseudocholinesterase deficiency.  NO--Prior Anesthesia Complication  NO--Latex Allergy  NO--Loose teeth  NO--History of Rheumatoid Arthritis  NO--BART  NO--Bleeding risk  NO--Other_____   written and verbal instructions with teach back on chlorhexidine shampoo provided,  pt verbalized understanding with returned demonstration  Patient verbalized understanding of instructions and was given the opportunity to ask questions and have them answered.

## 2023-03-14 NOTE — H&P PST ADULT - REASON FOR ADMISSION
Case Type: OP Block TimeSuite: OR GregoryProceduralist: Ascencion Hoang  Confirmed Surgery DateTime: 04-   Procedure: CIRCUMCISION  ERP?: NoLaterality: N/ALength of Procedure: 45 Minutes  Anesthesia Type: General Case Type: OP Block TimeSuite: OR NorthProceduralist: Ascencion Hoang  Confirmed Surgery DateTime: 04-   Procedure: CIRCUMCISION  Laterality: N/ALength of Procedure: 45 Minutes  Anesthesia Type: General

## 2023-03-15 DIAGNOSIS — N47.1 PHIMOSIS: ICD-10-CM

## 2023-03-15 DIAGNOSIS — Z01.818 ENCOUNTER FOR OTHER PREPROCEDURAL EXAMINATION: ICD-10-CM

## 2023-03-15 LAB
APPEARANCE UR: CLEAR — SIGNIFICANT CHANGE UP
BACTERIA # UR AUTO: NEGATIVE — SIGNIFICANT CHANGE UP
BILIRUB UR-MCNC: NEGATIVE — SIGNIFICANT CHANGE UP
COLOR SPEC: YELLOW — SIGNIFICANT CHANGE UP
DIFF PNL FLD: NEGATIVE — SIGNIFICANT CHANGE UP
EPI CELLS # UR: 0 /HPF — SIGNIFICANT CHANGE UP (ref 0–5)
GLUCOSE UR QL: NEGATIVE — SIGNIFICANT CHANGE UP
HYALINE CASTS # UR AUTO: 0 /LPF — SIGNIFICANT CHANGE UP (ref 0–7)
KETONES UR-MCNC: NEGATIVE — SIGNIFICANT CHANGE UP
LEUKOCYTE ESTERASE UR-ACNC: ABNORMAL
NITRITE UR-MCNC: NEGATIVE — SIGNIFICANT CHANGE UP
PH UR: 6.5 — SIGNIFICANT CHANGE UP (ref 5–8)
PROT UR-MCNC: SIGNIFICANT CHANGE UP
RBC CASTS # UR COMP ASSIST: 2 /HPF — SIGNIFICANT CHANGE UP (ref 0–4)
SP GR SPEC: 1.03 — SIGNIFICANT CHANGE UP (ref 1.01–1.03)
UROBILINOGEN FLD QL: SIGNIFICANT CHANGE UP
WBC UR QL: 0 /HPF — SIGNIFICANT CHANGE UP (ref 0–5)

## 2023-03-16 LAB
CULTURE RESULTS: NO GROWTH — SIGNIFICANT CHANGE UP
SPECIMEN SOURCE: SIGNIFICANT CHANGE UP

## 2023-03-29 ENCOUNTER — APPOINTMENT (OUTPATIENT)
Dept: UROLOGY | Facility: CLINIC | Age: 23
End: 2023-03-29
Payer: COMMERCIAL

## 2023-03-29 VITALS
DIASTOLIC BLOOD PRESSURE: 72 MMHG | TEMPERATURE: 98 F | BODY MASS INDEX: 25.39 KG/M2 | SYSTOLIC BLOOD PRESSURE: 112 MMHG | HEIGHT: 66 IN | OXYGEN SATURATION: 98 % | WEIGHT: 158 LBS | RESPIRATION RATE: 15 BRPM

## 2023-03-29 DIAGNOSIS — N48.89 OTHER SPECIFIED DISORDERS OF PENIS: ICD-10-CM

## 2023-03-29 DIAGNOSIS — N50.811 RIGHT TESTICULAR PAIN: ICD-10-CM

## 2023-03-29 DIAGNOSIS — N43.3 HYDROCELE, UNSPECIFIED: ICD-10-CM

## 2023-03-29 PROCEDURE — 99214 OFFICE O/P EST MOD 30 MIN: CPT

## 2023-03-29 NOTE — HISTORY OF PRESENT ILLNESS
[FreeTextEntry1] : This is a 22 year male who presents with fissures to the foreskin for the last year\par no history of diabetes \par usually worse after sex\par \par also with right testicular pain\par \par Feb 2023\par IPSS - 5 - mild symptoms\par IIEF - 14 -- mild to moderate ED\par \par PE -- healed fissues to penis\par no pain or abnormaltiies to the scrotum/testes. \par \par FEb 2023\par US scrotum -- 8mm right hydrocele otherwise normal

## 2023-04-04 ENCOUNTER — OUTPATIENT (OUTPATIENT)
Dept: INPATIENT UNIT | Facility: HOSPITAL | Age: 23
LOS: 1 days | Discharge: HOME | End: 2023-04-04
Payer: MEDICAID

## 2023-04-04 ENCOUNTER — RESULT REVIEW (OUTPATIENT)
Age: 23
End: 2023-04-04

## 2023-04-04 ENCOUNTER — APPOINTMENT (OUTPATIENT)
Dept: UROLOGY | Facility: AMBULATORY SURGERY CENTER | Age: 23
End: 2023-04-04

## 2023-04-04 ENCOUNTER — TRANSCRIPTION ENCOUNTER (OUTPATIENT)
Age: 23
End: 2023-04-04

## 2023-04-04 VITALS
SYSTOLIC BLOOD PRESSURE: 112 MMHG | WEIGHT: 156.97 LBS | DIASTOLIC BLOOD PRESSURE: 55 MMHG | RESPIRATION RATE: 18 BRPM | HEART RATE: 69 BPM | OXYGEN SATURATION: 98 % | TEMPERATURE: 99 F | HEIGHT: 68 IN

## 2023-04-04 VITALS
SYSTOLIC BLOOD PRESSURE: 122 MMHG | OXYGEN SATURATION: 99 % | TEMPERATURE: 98 F | RESPIRATION RATE: 23 BRPM | HEART RATE: 78 BPM | DIASTOLIC BLOOD PRESSURE: 64 MMHG

## 2023-04-04 DIAGNOSIS — N47.8 OTHER DISORDERS OF PREPUCE: ICD-10-CM

## 2023-04-04 DIAGNOSIS — N47.1 PHIMOSIS: ICD-10-CM

## 2023-04-04 DIAGNOSIS — N48.6 INDURATION PENIS PLASTICA: ICD-10-CM

## 2023-04-04 DIAGNOSIS — G89.18 OTHER ACUTE POSTPROCEDURAL PAIN: ICD-10-CM

## 2023-04-04 DIAGNOSIS — K21.9 GASTRO-ESOPHAGEAL REFLUX DISEASE WITHOUT ESOPHAGITIS: ICD-10-CM

## 2023-04-04 DIAGNOSIS — Z98.890 OTHER SPECIFIED POSTPROCEDURAL STATES: Chronic | ICD-10-CM

## 2023-04-04 DIAGNOSIS — Z87.438 PERSONAL HISTORY OF OTHER DISEASES OF MALE GENITAL ORGANS: ICD-10-CM

## 2023-04-04 PROBLEM — N43.3 HYDROCELE, UNSPECIFIED: Chronic | Status: ACTIVE | Noted: 2023-03-14

## 2023-04-04 PROCEDURE — 54161 CIRCUM 28 DAYS OR OLDER: CPT

## 2023-04-04 PROCEDURE — 88304 TISSUE EXAM BY PATHOLOGIST: CPT | Mod: 26

## 2023-04-04 PROCEDURE — 88304 TISSUE EXAM BY PATHOLOGIST: CPT

## 2023-04-04 RX ORDER — MORPHINE SULFATE 50 MG/1
2 CAPSULE, EXTENDED RELEASE ORAL
Refills: 0 | Status: DISCONTINUED | OUTPATIENT
Start: 2023-04-04 | End: 2023-04-04

## 2023-04-04 RX ORDER — SODIUM CHLORIDE 9 MG/ML
1000 INJECTION, SOLUTION INTRAVENOUS
Refills: 0 | Status: DISCONTINUED | OUTPATIENT
Start: 2023-04-04 | End: 2023-04-04

## 2023-04-04 RX ORDER — CELECOXIB 200 MG/1
200 CAPSULE ORAL DAILY
Qty: 14 | Refills: 0 | Status: ACTIVE | COMMUNITY
Start: 2023-04-04 | End: 1900-01-01

## 2023-04-04 RX ORDER — ACETAMINOPHEN 325 MG/1
325 TABLET, FILM COATED ORAL
Qty: 21 | Refills: 0 | Status: ACTIVE | COMMUNITY
Start: 2023-04-04 | End: 1900-01-01

## 2023-04-04 RX ORDER — ONDANSETRON 8 MG/1
4 TABLET, FILM COATED ORAL ONCE
Refills: 0 | Status: DISCONTINUED | OUTPATIENT
Start: 2023-04-04 | End: 2023-04-04

## 2023-04-04 RX ORDER — KETOROLAC TROMETHAMINE 30 MG/ML
15 SYRINGE (ML) INJECTION ONCE
Refills: 0 | Status: DISCONTINUED | OUTPATIENT
Start: 2023-04-04 | End: 2023-04-04

## 2023-04-04 RX ADMIN — SODIUM CHLORIDE 100 MILLILITER(S): 9 INJECTION, SOLUTION INTRAVENOUS at 18:13

## 2023-04-04 NOTE — ASU DISCHARGE PLAN (ADULT/PEDIATRIC) - PAIN MANAGEMENT
Submitted to pharmacy via Allscripts/Prescriptions electronically submitted to pharmacy from doctor's office

## 2023-04-04 NOTE — ASU DISCHARGE PLAN (ADULT/PEDIATRIC) - NS MD DC FALL RISK RISK
For information on Fall & Injury Prevention, visit: [email or web address] OR  [email or web address] OR  https://www.cdc.gov/steadi/patient.html

## 2023-04-04 NOTE — ASU PREOP CHECKLIST - PATIENT SENT TO
operating room [Subsequent Evaluation] : a subsequent evaluation for [FreeTextEntry2] : wax removal

## 2023-04-04 NOTE — ASU DISCHARGE PLAN (ADULT/PEDIATRIC) - FOLLOW UP APPOINTMENTS
[facility],  Endoscopy/Ambulatory Surgery [facility]:  [facility]

## 2023-04-04 NOTE — ASU DISCHARGE PLAN (ADULT/PEDIATRIC) - CARE PROVIDER_API CALL
Ascencion Hoang)  Urology  [address] [place] [address], [address]  [place] [address]  Phone: [phone]  Fax: [phone]  Follow Up Time: 2 weeks

## 2023-04-04 NOTE — ASU DISCHARGE PLAN (ADULT/PEDIATRIC) - ACCOMPANIED BY
Scheduled LEEP 1-8-2021 arrive 6 am for the 7:30 am procedure. Left message for patient to call back.   Family

## 2023-04-04 NOTE — ASU DISCHARGE PLAN (ADULT/PEDIATRIC) - ASU DC SPECIAL INSTRUCTIONSFT
Statement Selected You are being discharged from [facility].    -Diet: Advance to regular diet as tolerated.  -Incisions: Keep dry for 48hrs. After that, you may remove dressing & shower but avoid aggressively scrubbing incision site.  -Medications: You may resume any home medications. Prescriptions have been sent to your pharmacy, please take as instructed on bottle.  -Follow-up: Please follow-up with Dr. Hoang in the office in 2 weeks.   -Please call the office with persistent fever greater than 100.4F, chest pain, shortness of breath, uncontrollable nausea/vomiting/pain, bleeding or worsening redness around wound, inability to urinate. If you have any further questions about your care, please do not hesitate to contact Dr. Hoang's office.

## 2023-04-06 LAB — SURGICAL PATHOLOGY STUDY: SIGNIFICANT CHANGE UP

## 2023-04-19 ENCOUNTER — APPOINTMENT (OUTPATIENT)
Dept: UROLOGY | Facility: CLINIC | Age: 23
End: 2023-04-19

## 2023-06-13 ENCOUNTER — NON-APPOINTMENT (OUTPATIENT)
Age: 23
End: 2023-06-13

## 2023-06-13 NOTE — PRE-ANESTHESIA EVALUATION ADULT - BP NONINVASIVE SYSTOLIC (MM HG)
[FreeTextEntry1] : CAD - stable no angina\par s/p TAVR\par AF rate controlled - continue with present therapy.   [EKG obtained to assist in diagnosis and management of assessed problem(s)] : EKG obtained to assist in diagnosis and management of assessed problem(s) 112

## 2024-09-24 NOTE — ED ADULT TRIAGE NOTE - TEMPERATURE IN FAHRENHEIT (DEGREES F)
----- Message from Remberto Burden sent at 9/24/2024  8:10 AM CDT -----  Name Of Caller: Lester        Provider Name:Clayton Wiley        Does patient feel the need to be seen today? no        Relationship to the Pt?:  father        Contact Preference?:[phone]          What is the nature of the call?:Patient's father states that he would like to speak with someone in the office in regards to getting a note for his son to return to school.  
Called ans spoke with dad. Dad stated that pt needed a school note to return to school after procedure on yesterday with Dr. Wiley. Informed dad that school note will be sent to My Chart.     Hema v/u   
98

## 2024-12-25 ENCOUNTER — EMERGENCY (EMERGENCY)
Facility: HOSPITAL | Age: 24
LOS: 0 days | Discharge: HOME | End: 2024-12-25
Attending: EMERGENCY MEDICINE
Payer: COMMERCIAL

## 2024-12-25 VITALS
SYSTOLIC BLOOD PRESSURE: 118 MMHG | OXYGEN SATURATION: 95 % | TEMPERATURE: 101 F | DIASTOLIC BLOOD PRESSURE: 77 MMHG | RESPIRATION RATE: 18 BRPM | HEART RATE: 103 BPM | WEIGHT: 160.06 LBS

## 2024-12-25 VITALS
OXYGEN SATURATION: 96 % | SYSTOLIC BLOOD PRESSURE: 115 MMHG | RESPIRATION RATE: 18 BRPM | HEART RATE: 98 BPM | TEMPERATURE: 100 F | DIASTOLIC BLOOD PRESSURE: 74 MMHG

## 2024-12-25 DIAGNOSIS — R07.89 OTHER CHEST PAIN: ICD-10-CM

## 2024-12-25 DIAGNOSIS — R09.81 NASAL CONGESTION: ICD-10-CM

## 2024-12-25 DIAGNOSIS — J06.9 ACUTE UPPER RESPIRATORY INFECTION, UNSPECIFIED: ICD-10-CM

## 2024-12-25 DIAGNOSIS — R00.0 TACHYCARDIA, UNSPECIFIED: ICD-10-CM

## 2024-12-25 DIAGNOSIS — Z98.890 OTHER SPECIFIED POSTPROCEDURAL STATES: Chronic | ICD-10-CM

## 2024-12-25 DIAGNOSIS — R50.9 FEVER, UNSPECIFIED: ICD-10-CM

## 2024-12-25 DIAGNOSIS — J45.901 UNSPECIFIED ASTHMA WITH (ACUTE) EXACERBATION: ICD-10-CM

## 2024-12-25 LAB
ALBUMIN SERPL ELPH-MCNC: 4.5 G/DL — SIGNIFICANT CHANGE UP (ref 3.5–5.2)
ALP SERPL-CCNC: 122 U/L — HIGH (ref 30–115)
ALT FLD-CCNC: 24 U/L — SIGNIFICANT CHANGE UP (ref 0–41)
ANION GAP SERPL CALC-SCNC: 11 MMOL/L — SIGNIFICANT CHANGE UP (ref 7–14)
AST SERPL-CCNC: 16 U/L — SIGNIFICANT CHANGE UP (ref 0–41)
BASOPHILS # BLD AUTO: 0.06 K/UL — SIGNIFICANT CHANGE UP (ref 0–0.2)
BASOPHILS NFR BLD AUTO: 0.5 % — SIGNIFICANT CHANGE UP (ref 0–1)
BILIRUB SERPL-MCNC: 0.4 MG/DL — SIGNIFICANT CHANGE UP (ref 0.2–1.2)
BUN SERPL-MCNC: 15 MG/DL — SIGNIFICANT CHANGE UP (ref 10–20)
CALCIUM SERPL-MCNC: 9.2 MG/DL — SIGNIFICANT CHANGE UP (ref 8.4–10.4)
CHLORIDE SERPL-SCNC: 100 MMOL/L — SIGNIFICANT CHANGE UP (ref 98–110)
CO2 SERPL-SCNC: 27 MMOL/L — SIGNIFICANT CHANGE UP (ref 17–32)
CREAT SERPL-MCNC: 1 MG/DL — SIGNIFICANT CHANGE UP (ref 0.7–1.5)
EGFR: 108 ML/MIN/1.73M2 — SIGNIFICANT CHANGE UP
EOSINOPHIL # BLD AUTO: 0.29 K/UL — SIGNIFICANT CHANGE UP (ref 0–0.7)
EOSINOPHIL NFR BLD AUTO: 2.2 % — SIGNIFICANT CHANGE UP (ref 0–8)
GLUCOSE SERPL-MCNC: 83 MG/DL — SIGNIFICANT CHANGE UP (ref 70–99)
HCT VFR BLD CALC: 43.7 % — SIGNIFICANT CHANGE UP (ref 42–52)
HGB BLD-MCNC: 14.1 G/DL — SIGNIFICANT CHANGE UP (ref 14–18)
IMM GRANULOCYTES NFR BLD AUTO: 1.4 % — HIGH (ref 0.1–0.3)
LYMPHOCYTES # BLD AUTO: 1.49 K/UL — SIGNIFICANT CHANGE UP (ref 1.2–3.4)
LYMPHOCYTES # BLD AUTO: 11.3 % — LOW (ref 20.5–51.1)
MCHC RBC-ENTMCNC: 26.7 PG — LOW (ref 27–31)
MCHC RBC-ENTMCNC: 32.3 G/DL — SIGNIFICANT CHANGE UP (ref 32–37)
MCV RBC AUTO: 82.8 FL — SIGNIFICANT CHANGE UP (ref 80–94)
MONOCYTES # BLD AUTO: 0.58 K/UL — SIGNIFICANT CHANGE UP (ref 0.1–0.6)
MONOCYTES NFR BLD AUTO: 4.4 % — SIGNIFICANT CHANGE UP (ref 1.7–9.3)
NEUTROPHILS # BLD AUTO: 10.54 K/UL — HIGH (ref 1.4–6.5)
NEUTROPHILS NFR BLD AUTO: 80.2 % — HIGH (ref 42.2–75.2)
NRBC # BLD: 0 /100 WBCS — SIGNIFICANT CHANGE UP (ref 0–0)
PLATELET # BLD AUTO: 215 K/UL — SIGNIFICANT CHANGE UP (ref 130–400)
PMV BLD: 12.8 FL — HIGH (ref 7.4–10.4)
POTASSIUM SERPL-MCNC: 3.9 MMOL/L — SIGNIFICANT CHANGE UP (ref 3.5–5)
POTASSIUM SERPL-SCNC: 3.9 MMOL/L — SIGNIFICANT CHANGE UP (ref 3.5–5)
PROT SERPL-MCNC: 7.4 G/DL — SIGNIFICANT CHANGE UP (ref 6–8)
RBC # BLD: 5.28 M/UL — SIGNIFICANT CHANGE UP (ref 4.7–6.1)
RBC # FLD: 12.8 % — SIGNIFICANT CHANGE UP (ref 11.5–14.5)
SODIUM SERPL-SCNC: 138 MMOL/L — SIGNIFICANT CHANGE UP (ref 135–146)
WBC # BLD: 13.15 K/UL — HIGH (ref 4.8–10.8)
WBC # FLD AUTO: 13.15 K/UL — HIGH (ref 4.8–10.8)

## 2024-12-25 PROCEDURE — 0241U: CPT

## 2024-12-25 PROCEDURE — 96374 THER/PROPH/DIAG INJ IV PUSH: CPT

## 2024-12-25 PROCEDURE — 85025 COMPLETE CBC W/AUTO DIFF WBC: CPT

## 2024-12-25 PROCEDURE — 93010 ELECTROCARDIOGRAM REPORT: CPT

## 2024-12-25 PROCEDURE — 99285 EMERGENCY DEPT VISIT HI MDM: CPT

## 2024-12-25 PROCEDURE — 99285 EMERGENCY DEPT VISIT HI MDM: CPT | Mod: 25

## 2024-12-25 PROCEDURE — 93005 ELECTROCARDIOGRAM TRACING: CPT

## 2024-12-25 PROCEDURE — 80053 COMPREHEN METABOLIC PANEL: CPT

## 2024-12-25 PROCEDURE — 94640 AIRWAY INHALATION TREATMENT: CPT

## 2024-12-25 PROCEDURE — 71046 X-RAY EXAM CHEST 2 VIEWS: CPT

## 2024-12-25 PROCEDURE — 71046 X-RAY EXAM CHEST 2 VIEWS: CPT | Mod: 26

## 2024-12-25 PROCEDURE — 96375 TX/PRO/DX INJ NEW DRUG ADDON: CPT

## 2024-12-25 PROCEDURE — 36415 COLL VENOUS BLD VENIPUNCTURE: CPT

## 2024-12-25 RX ORDER — AZITHROMYCIN 250 MG/1
1 TABLET, FILM COATED ORAL
Qty: 4 | Refills: 0
Start: 2024-12-25 | End: 2024-12-28

## 2024-12-25 RX ORDER — ALBUTEROL 90 MCG
2 AEROSOL (GRAM) INHALATION
Qty: 1 | Refills: 0
Start: 2024-12-25 | End: 2024-12-29

## 2024-12-25 RX ORDER — IPRATROPIUM BROMIDE AND ALBUTEROL SULFATE 2.5; .5 MG/3ML; MG/3ML
3 SOLUTION RESPIRATORY (INHALATION)
Refills: 0 | Status: COMPLETED | OUTPATIENT
Start: 2024-12-25 | End: 2024-12-25

## 2024-12-25 RX ORDER — AZITHROMYCIN 250 MG/1
1 TABLET, FILM COATED ORAL
Qty: 1 | Refills: 0
Start: 2024-12-25 | End: 2024-12-25

## 2024-12-25 RX ORDER — CEFPODOXIME PROXETIL 100 MG/5ML
1 GRANULE, FOR SUSPENSION ORAL
Qty: 10 | Refills: 0
Start: 2024-12-25 | End: 2024-12-29

## 2024-12-25 RX ORDER — KETOROLAC TROMETHAMINE 30 MG/ML
30 INJECTION INTRAMUSCULAR; INTRAVENOUS ONCE
Refills: 0 | Status: COMPLETED | OUTPATIENT
Start: 2024-12-25 | End: 2024-12-25

## 2024-12-25 RX ORDER — ACETAMINOPHEN 500MG 500 MG/1
975 TABLET, COATED ORAL ONCE
Refills: 0 | Status: COMPLETED | OUTPATIENT
Start: 2024-12-25 | End: 2024-12-25

## 2024-12-25 RX ORDER — METHYLPREDNISOLONE SOD SUCC 125 MG
125 VIAL (EA) INJECTION ONCE
Refills: 0 | Status: COMPLETED | OUTPATIENT
Start: 2024-12-25 | End: 2024-12-25

## 2024-12-25 RX ADMIN — Medication 150 GRAM(S): at 12:59

## 2024-12-25 RX ADMIN — Medication 125 MILLIGRAM(S): at 12:59

## 2024-12-25 RX ADMIN — IPRATROPIUM BROMIDE AND ALBUTEROL SULFATE 3 MILLILITER(S): 2.5; .5 SOLUTION RESPIRATORY (INHALATION) at 12:30

## 2024-12-25 RX ADMIN — IPRATROPIUM BROMIDE AND ALBUTEROL SULFATE 3 MILLILITER(S): 2.5; .5 SOLUTION RESPIRATORY (INHALATION) at 13:00

## 2024-12-25 RX ADMIN — ACETAMINOPHEN 500MG 975 MILLIGRAM(S): 500 TABLET, COATED ORAL at 13:19

## 2024-12-25 RX ADMIN — IPRATROPIUM BROMIDE AND ALBUTEROL SULFATE 3 MILLILITER(S): 2.5; .5 SOLUTION RESPIRATORY (INHALATION) at 12:45

## 2024-12-25 NOTE — ED PROVIDER NOTE - PHYSICAL EXAMINATION
VITAL SIGNS: I have reviewed nursing notes and confirm.  CONSTITUTIONAL: well-appearing, non-toxic, NAD  SKIN: Warm dry, normal skin turgor, no acute rash, no bruising  HEAD: NCAT  EYES: EOMI, PERRLA, no scleral icterus, normal conjunctiva  ENT: Moist mucous membranes, OR clear. Normal pharynx, nasal congestion present, no purulent drainage.  NECK: Supple; non tender. Full ROM. No cervical LAD  CARD: RRR, no murmurs, rubs or gallops  RESP: Poor air movement in bilateral lungs. No wheezing, rales, or rhonchi. No increased WOB. Nonproductive cough.  ABD: soft, + BS, non-tender, non-distended, no rebound or guarding. No CVA tenderness  EXT: Full ROM, no bony tenderness, pulses intact in bilateral UE and LE, no pedal edema, no calf tenderness  NEURO: normal motor. normal sensory. Normal gait.  PSYCH: Cooperative, appropriate.

## 2024-12-25 NOTE — ED ADULT TRIAGE NOTE - CHIEF COMPLAINT QUOTE
pt was having cough and chills. pt with cough and sore throat. has gotten worse after prednisone. pt feeling weak and congested

## 2024-12-25 NOTE — ED PROVIDER NOTE - CARE PROVIDER_API CALL
Félix Brooks  Pulmonary Disease  [address] [place] [id number]  Phone: [phone]  Fax: [phone]  Follow Up Time: Routine

## 2024-12-25 NOTE — ED PROVIDER NOTE - NSFOLLOWUPINSTRUCTIONS_ED_ALL_ED_FT
Our Emergency Department Referral Coordinators will be reaching out to you in the next 24-48 hours from 9:00am to 5:00pm to schedule a follow up appointment. Please expect a phone call from the hospital in that time frame. If you do not receive a call or if you have any questions or concerns, you can reach them at   [phone].    Asthma    Asthma is a recurring condition in which the airways tighten and narrow, making it difficult to breath. Asthma exacerbations, also called asthma attacks, range from minor to life-threatening. Symptoms include wheezing, coughing, chest tightness, or shortness of breath. The diagnosis of asthma is made by a review of your medical history and a physical exam, but may involve additional testing. Asthma cannot be cured, but medicines and lifestyle changes can help control it. Avoid triggers of asthma which may include animal dander, pollen, mold, smoke, air pollutants, etc.     SEEK IMMEDIATE MEDICAL CARE IF YOU HAVE THE FOLLOWING SYMPTOMS: worsening of symptoms, symptoms that do not respond to medication, shortness of breath at rest, chest pain, bluish discoloration to lips or fingertips, lightheadedness/dizziness, or fever.

## 2024-12-25 NOTE — ED PROVIDER NOTE - CLINICAL SUMMARY MEDICAL DECISION MAKING FREE TEXT BOX
24-year-old male with no significant past medical history presents with rhinorrhea, congestion, dry cough, that has been ongoing for 2 weeks.  Patient was diagnosed with upper respiratory infection/bronchitis questionable asthma as he went to urgent care for symptoms, given albuterol pump had negative chest x-ray, and steroids finished the course 2 days ago, patient was also seen by dental for dental pain which resolved, told to take amoxicillin for 3 days.  Patient states upper respiratory symptoms persisted so came to the ED.  No fever, chills, n/v, cp, sob, pleuritic cp, palpitations, diaphoresis, sore throat, ear pain, neck pain/stiffness, abd pain, diarrhea, constipation, urinary symptoms, trauma, ha/lh/dizziness, numbness/tingling, sick contacts, recent travel, or rash.     on exam: non toxic appearing pt sitting on stretcher in nad, speaking full sentences, no rash, mmm, no erythema, exudates, or petechiae, (+) rhinorrhea, TM's visualized b/l with good cone of light, no erythema or effusions, tachy, radial pulses 2/4 b/l, no jvd,  (+) congestion,  breath sounds present b/l, wheezing present b/l, no accessory muscle use, no tachypnea, no stridor, bs present throughout all 4 quadrants, abd soft, nd, nt, no rebound tenderness or guarding, no cvat, no pelvic pain to palpation, no suprapubic pain to palpation, FROM of ext, no edema, no calf pain/swelling/erythema, AAOx3.  motor 5/5 and sensation intact throughout upper and lower ext. No focal deficits.    Plan: Monitor, EKG, CXR, labs, ivf, anti pyretic, dup nebs, steroids, reassess.    Labs and EKG were ordered and reviewed.  Imaging was ordered and reviewed by me.  Appropriate medications for patient's presenting complaints were ordered and effects were reassessed.  Patient's records (prior hospital, ED visit) were reviewed.  Additional history was obtained from wife. Escalation to admission/observation was considered. However patient feels much better and is comfortable with discharge.  Appropriate follow-up was arranged.  Supportive care and home care discussed in detail. Patient aware they may have to return for re-evaluation and possible admission if outpatient treatment fails. Strict return precautions discussed.

## 2024-12-25 NOTE — ED PROVIDER NOTE - DIFFERENTIAL DIAGNOSIS
The differential diagnosis for patients clinical presentation includes but is not limited to:  viral illness  uri  bacterial viral pna  metabolic vs infectious etiology   arrythmia Differential Diagnosis

## 2024-12-25 NOTE — ED PROVIDER NOTE - EKG/XRAY ADDITIONAL INFORMATION
no pna or ptx  Sinus tachycardia at 130 bpm, WV interval 124, QRS 74, QTc 456 no ST elevations or depressions

## 2024-12-25 NOTE — ED PROVIDER NOTE - PROGRESS NOTE DETAILS
Authored by Dr. Hirsch: Reassessed after nebulization treatments, improved air movement bilaterally. ED Attending CUBA Rivas  pt feeling better  ctabl . no wheezing or crackles, reports feeling better, aware of signs and symptoms to return for, reports has pmd he will follow up with, also given pulmonary , copy of results provided.

## 2024-12-25 NOTE — ED PROVIDER NOTE - ATTENDING CONTRIBUTION TO CARE
24-year-old male with no significant past medical history presents with rhinorrhea, congestion, dry cough, that has been ongoing for 2 weeks.  Patient was diagnosed with upper respiratory infection/bronchitis questionable asthma as he went to urgent care for symptoms, given albuterol pump had negative chest x-ray, and steroids finished the course 2 days ago, patient was also seen by dental for dental pain which resolved, told to take amoxicillin for 3 days.  Patient states upper respiratory symptoms persisted so came to the ED.  No fever, chills, n/v, cp, sob, pleuritic cp, palpitations, diaphoresis, sore throat, ear pain, neck pain/stiffness, abd pain, diarrhea, constipation, urinary symptoms, trauma, ha/lh/dizziness, numbness/tingling, sick contacts, recent travel, or rash.     on exam: non toxic appearing pt sitting on stretcher in nad, speaking full sentences, no rash, mmm, no erythema, exudates, or petechiae, (+) rhinorrhea, TM's visualized b/l with good cone of light, no erythema or effusions, tachy, radial pulses 2/4 b/l, no jvd,  (+) congestion,  breath sounds present b/l, wheezing present b/l, no accessory muscle use, no tachypnea, no stridor, bs present throughout all 4 quadrants, abd soft, nd, nt, no rebound tenderness or guarding, no cvat, no pelvic pain to palpation, no suprapubic pain to palpation, FROM of ext, no edema, no calf pain/swelling/erythema, AAOx3.  motor 5/5 and sensation intact throughout upper and lower ext. No focal deficits.    Plan: Monitor, EKG, CXR, labs, ivf, anti pyretic, dup nebs, steroids, reassess.

## 2024-12-25 NOTE — ED PROVIDER NOTE - PATIENT PORTAL LINK FT
You can access the FollowMyHealth Patient Portal offered by [facility] by registering at the following website: [email or web address]. By joining [facility]’s FollowMyHealth portal, you will also be able to view your health information using other applications (apps) compatible with our system.

## 2024-12-25 NOTE — ED PROVIDER NOTE - OBJECTIVE STATEMENT
24y male with no PMHx who presents for fever, cough, congestion. Reports URI symptoms x 2 weeks. Went to urgent care and was given a 5 day course of prednisone (completed it 2 days ago), and was given an albuterol pump. He states separately that he was taking amoxicillin for dental pain, but only took 3 days worth before he was told to stop taking the antibiotics by urgent care. Reports worsening chest tightness and no improvement in URI symptoms. No formal asthma diagnosis, but has required an albuterol pump in the past when he has URIs. Has not taken any meds today. No headache, vision changes, neck pain, palpitations, n/v/d, abdominal pain, urinary symptoms, weakness, numbness, syncope, recent travel, known sick contacts. Denies tobacco use, alcohol use, substance use.

## 2024-12-26 LAB
FLUAV AG NPH QL: DETECTED
FLUBV AG NPH QL: SIGNIFICANT CHANGE UP
RSV RNA NPH QL NAA+NON-PROBE: SIGNIFICANT CHANGE UP
SARS-COV-2 RNA SPEC QL NAA+PROBE: SIGNIFICANT CHANGE UP